# Patient Record
Sex: MALE | Race: WHITE | Employment: FULL TIME | URBAN - METROPOLITAN AREA
[De-identification: names, ages, dates, MRNs, and addresses within clinical notes are randomized per-mention and may not be internally consistent; named-entity substitution may affect disease eponyms.]

---

## 2020-05-31 ENCOUNTER — APPOINTMENT (OUTPATIENT)
Dept: CT IMAGING | Age: 60
End: 2020-05-31
Payer: COMMERCIAL

## 2020-05-31 ENCOUNTER — HOSPITAL ENCOUNTER (EMERGENCY)
Age: 60
Discharge: HOME | End: 2020-05-31
Attending: EMERGENCY MEDICINE
Payer: COMMERCIAL

## 2020-05-31 VITALS
RESPIRATION RATE: 16 BRPM | HEART RATE: 81 BPM | WEIGHT: 188.71 LBS | TEMPERATURE: 97.7 F | OXYGEN SATURATION: 100 % | HEIGHT: 70 IN | DIASTOLIC BLOOD PRESSURE: 97 MMHG | SYSTOLIC BLOOD PRESSURE: 182 MMHG | BODY MASS INDEX: 27.02 KG/M2

## 2020-05-31 DIAGNOSIS — Q38.5 ABNORMALITY OF PALATE: ICD-10-CM

## 2020-05-31 DIAGNOSIS — M79.10 MUSCLE SORENESS: ICD-10-CM

## 2020-05-31 DIAGNOSIS — T07.XXXA ABRASIONS OF MULTIPLE SITES: ICD-10-CM

## 2020-05-31 DIAGNOSIS — S00.83XA FACIAL CONTUSION, INITIAL ENCOUNTER: Primary | ICD-10-CM

## 2020-05-31 PROCEDURE — 70486 CT MAXILLOFACIAL W/O DYE: CPT

## 2020-05-31 PROCEDURE — 99282 EMERGENCY DEPT VISIT SF MDM: CPT

## 2020-05-31 RX ORDER — METHOCARBAMOL 750 MG/1
750 TABLET, FILM COATED ORAL 4 TIMES DAILY
Qty: 20 TAB | Refills: 0 | Status: SHIPPED | OUTPATIENT
Start: 2020-05-31 | End: 2020-06-05

## 2020-05-31 RX ORDER — MELOXICAM 15 MG/1
15 TABLET ORAL DAILY
Qty: 7 TAB | Refills: 0 | Status: SHIPPED | OUTPATIENT
Start: 2020-05-31 | End: 2020-06-07

## 2020-05-31 NOTE — DISCHARGE INSTRUCTIONS
Rest, ice, elevation of head to decrease swelling. Follow up with primary care for recheck of current symptoms and blood pressure. Return to the Emergency Dept for any worsening headache, confusion, difficulty with opening/closing mouth/chewing, shortness of breath.

## 2020-05-31 NOTE — ED PROVIDER NOTES
EMERGENCY DEPARTMENT HISTORY AND PHYSICAL EXAM      Date: 5/31/2020  Patient Name: Steve Christina    History of Presenting Illness     Chief Complaint   Patient presents with    Head Injury     pt reports that he fell face first into a privacy fence last night after consuming etoh; pt unsure of LOC; denies any sx's at this time, but states he was forced to come by friends/family       History Provided By: Patient    HPI: Steev Christina, 64 y.o. male presents ambulatory to the Emergency Dept with concern for facial injury after fall last pm.  Pt states he fell face first into a privacy fence last night. He admits to consuming ETOH at the time. unsure of LOC. He denied headache, confusion, neck pain, visual changes, or nausea/vomiting. He has multiple superficial abrasions to his face but feels the upper palate is \"cracked\". He denied shortness of breath or difficulty swallowing. No numbness. He denied nasal bone or orbital pain. Pt is o/w healthy without fever, chills, cough, congestion, ST, shortness of breath, chest pain, N/V/D. Chief Complaint: head/face injury after fall last evening  Duration: 1 Days  Timing:  Acute  Location: head/face  Quality: Aching  Severity: Moderate  Modifying Factors: pt states he feels his upper palate is broken  Associated Symptoms: denies any other associated signs or symptoms        There are no other complaints, changes, or physical findings at this time. PCP: None        Past History     Past Medical History:  History reviewed. No pertinent past medical history. Past Surgical History:  History reviewed. No pertinent surgical history. Family History:  History reviewed. No pertinent family history.     Social History:  Social History     Tobacco Use    Smoking status: Not on file   Substance Use Topics    Alcohol use: Not on file    Drug use: Not on file       Allergies:  No Known Allergies      Review of Systems   Review of Systems   Constitutional: Negative for chills and fever. HENT: Positive for dental problem and facial swelling. Negative for congestion, rhinorrhea and sore throat. Eyes: Negative for photophobia, pain and visual disturbance. Respiratory: Negative for cough and shortness of breath. Cardiovascular: Negative for chest pain and palpitations. Gastrointestinal: Negative for abdominal pain, diarrhea, nausea and vomiting. Genitourinary: Negative for dysuria and hematuria. Musculoskeletal: Negative for neck pain and neck stiffness. Skin: Positive for wound. Negative for rash. Allergic/Immunologic: Negative for food allergies and immunocompromised state. Neurological: Negative for dizziness, speech difficulty and headaches. Hematological: Negative for adenopathy. Does not bruise/bleed easily. Psychiatric/Behavioral: Negative for agitation and confusion. All other systems reviewed and are negative. Physical Exam   Physical Exam  Vitals signs and nursing note reviewed. Constitutional:       General: He is not in acute distress. Appearance: Normal appearance. He is well-developed and normal weight. He is not diaphoretic. HENT:      Head: Normocephalic and atraumatic. Nose: Nose normal.      Mouth/Throat:      Pharynx: No oropharyngeal exudate. Comments: Tenderness noted to upper palate, evidence of contusion noted to upper gum line. No dental fracture or loose teeth noted. Eyes:      General: No scleral icterus. Right eye: No discharge. Left eye: No discharge. Extraocular Movements: Extraocular movements intact. Conjunctiva/sclera: Conjunctivae normal.      Pupils: Pupils are equal, round, and reactive to light. Neck:      Musculoskeletal: Normal range of motion and neck supple. No muscular tenderness. Thyroid: No thyromegaly. Vascular: No JVD. Trachea: No tracheal deviation. Cardiovascular:      Rate and Rhythm: Normal rate and regular rhythm.       Pulses: Normal pulses. Heart sounds: Normal heart sounds. Pulmonary:      Effort: Pulmonary effort is normal. No respiratory distress. Breath sounds: Normal breath sounds. No wheezing. Musculoskeletal: Normal range of motion. General: No tenderness. Skin:     General: Skin is warm and dry. Findings: Lesion present. Comments: Superficial abrasions noted to face, no active bleeding   Neurological:      General: No focal deficit present. Mental Status: He is alert and oriented to person, place, and time. Cranial Nerves: No cranial nerve deficit. Sensory: No sensory deficit. Motor: No weakness or abnormal muscle tone. Coordination: Coordination normal.      Comments: FNF, HTS neg bilat, no pronator drift   Psychiatric:         Mood and Affect: Mood normal.         Behavior: Behavior normal.         Judgment: Judgment normal.         Diagnostic Study Results     Labs -   No results found for this or any previous visit (from the past 12 hour(s)). Radiologic Studies -   CT MAXILLOFACIAL WO CONT   Final Result   IMPRESSION:    No fracture. Medical Decision Making   I am the first provider for this patient. I reviewed the vital signs, available nursing notes, past medical history, past surgical history, family history and social history. Vital Signs-Reviewed the patient's vital signs. Patient Vitals for the past 12 hrs:   Temp Pulse Resp BP SpO2   05/31/20 1640 97.7 °F (36.5 °C) 81 16 (!) 182/97 100 %           Records Reviewed: Nursing Notes, Old Medical Records, Previous Radiology Studies and Previous Laboratory Studies    Provider Notes (Medical Decision Making):   Facial contusion, palate disruption, abrasion, dental injury    ED Course:   Initial assessment performed. The patients presenting problems have been discussed, and they are in agreement with the care plan formulated and outlined with them.   I have encouraged them to ask questions as they arise throughout their visit. Case d/w Dr. Stas Turcios. He evaluated the patient at bedside. Will await CT results. DISCHARGE NOTE:  The care plan has been outline with the patient and/or family, who verbally conveyed understanding and agreement. Available results have been reviewed. Patient and/or family understand the follow up plan as outlined and discharge instructions. Should their condition deterioration at any time after discharge the patient agrees to return, follow up sooner than outlined or seek medical assistance at the closest Emergency Room as soon as possible. Questions have been answered. Discharge instructions and educational information regarding the patient's diagnosis as well a list of reasons why the patient would want to seek immediate medical attention, should their condition change, were reviewed directly with the patient/family          PLAN:  1. Discharge Medication List as of 5/31/2020  5:29 PM      START taking these medications    Details   methocarbamoL (ROBAXIN) 750 mg tablet Take 1 Tab by mouth four (4) times daily for 5 days. , Print, Disp-20 Tab, R-0      meloxicam (MOBIC) 15 mg tablet Take 1 Tab by mouth daily for 7 days. , Print, Disp-7 Tab, R-0           2. Follow-up Information     Follow up With Specialties Details Why Contact Info    Vini Claros MD [place] [address]  [address] [address] [phone]      [facility] EMERGENCY DEPT Emergency Medicine  If symptoms worsen [address]  [address]  [phone]        Return to ED if worse     Diagnosis     Clinical Impression:   1. Facial contusion, initial encounter    2. Abnormality of palate    3. Abrasions of multiple sites    4.  Muscle soreness